# Patient Record
Sex: FEMALE | Race: WHITE
[De-identification: names, ages, dates, MRNs, and addresses within clinical notes are randomized per-mention and may not be internally consistent; named-entity substitution may affect disease eponyms.]

---

## 2019-02-25 ENCOUNTER — HOSPITAL ENCOUNTER (EMERGENCY)
Dept: HOSPITAL 97 - ER | Age: 64
Discharge: HOME | End: 2019-02-25
Payer: COMMERCIAL

## 2019-02-25 VITALS — DIASTOLIC BLOOD PRESSURE: 77 MMHG | SYSTOLIC BLOOD PRESSURE: 147 MMHG

## 2019-02-25 VITALS — OXYGEN SATURATION: 99 %

## 2019-02-25 VITALS — TEMPERATURE: 98.4 F

## 2019-02-25 DIAGNOSIS — J45.909: ICD-10-CM

## 2019-02-25 DIAGNOSIS — B02.39: Primary | ICD-10-CM

## 2019-02-25 DIAGNOSIS — Z88.2: ICD-10-CM

## 2019-02-25 PROCEDURE — 99283 EMERGENCY DEPT VISIT LOW MDM: CPT

## 2019-02-25 PROCEDURE — 96374 THER/PROPH/DIAG INJ IV PUSH: CPT

## 2019-02-25 PROCEDURE — 96375 TX/PRO/DX INJ NEW DRUG ADDON: CPT

## 2019-02-25 NOTE — ER
Nurse's Notes                                                                                     

 [facility]                                                                

Name: Hitesh Chris                                                                             

Age: 63 yrs                                                                                       

Sex: Female                                                                                       

: 1955                                                                                   

MRN: [id number]                                                                                   

Arrival Date: 2019                                                                          

Time: 10:26                                                                                       

Account#: [id number]                                                                            

Bed 25                                                                                            

Private MD: None, None                                                                            

Diagnosis: Other herpes zoster eye disease;Zoster [herpes zoster]                                 

                                                                                                  

Presentation:                                                                                     

                                                                                             

10:44 Presenting complaint: Patient states: PRIMARY DR SENT ME HERE FOR SHINGLES IN MY LEFT   ls4 

      EYE. I HAVE HAD A HEADACHE ALL WEEKEND. ITS THROBBING. Transition of care: patient was      

      not received from another setting of care. Onset of symptoms was 2019.         

      Risk Assessment: Do you want to hurt yourself or someone else? Patient reports no           

      desire to harm self or others. Initial Sepsis Screen: Does the patient meet any 2           

      criteria? No. Patient's initial sepsis screen is negative. Does the patient have a          

      suspected source of infection? No. Patient's initial sepsis screen is negative. Care        

      prior to arrival: None.                                                                     

10:44 Method Of Arrival: Ambulatory                                                           ls4 

10:44 Acuity: JOSÉ MIGUEL 3                                                                           ls4 

                                                                                                  

Triage Assessment:                                                                                

10:46 General: Appears uncomfortable, Behavior is calm, cooperative. Pain: Complains of pain  ls4 

      in LEFT EYE AND HEAD. EENT: Eyes REDNESS TO LEFT EYE. Neuro: No deficits noted.             

      Cardiovascular: No deficits noted. Respiratory: No deficits noted.                          

                                                                                                  

Historical:                                                                                       

- Allergies:                                                                                      

10:46 Sulfa (Sulfonamide Antibiotics);                                                        ls4 

- Home Meds:                                                                                      

10:46 Albuterol Inhl [Active]; Advair Diskus inhalation Inhl [Active];                        ls4 

- PMHx:                                                                                           

10:46 Asthma;                                                                                 ls4 

                                                                                                  

- Immunization history:: Adult Immunizations up to date.                                          

- Social history:: Smoking status: unknown Patient/guardian denies using alcohol,                 

  street drugs, The patient lives with family.                                                    

- Ebola Screening: : Patient negative for fever greater than or equal to 101.5 degrees            

  Fahrenheit, and additional compatible Ebola Virus Disease symptoms Patient denies               

  exposure to infectious person Patient denies travel to an Ebola-affected area in the            

  21 days before illness onset No symptoms or risks identified at this time.                      

- Family history:: not pertinent.                                                                 

                                                                                                  

                                                                                                  

Screening:                                                                                        

10:48 Abuse screen: Denies threats or abuse. Denies injuries from another. Nutritional        ls4 

      screening: No deficits noted. Tuberculosis screening: No symptoms or risk factors           

      identified. Fall Risk None identified.                                                      

                                                                                                  

Assessment:                                                                                       

10:48 General: SEE TRIAGE ASSESSMENT .                                                        ls4 

11:14 Reassessment: Patient appears in no apparent distress at this time.                     ls4 

11:57 Reassessment: Patient and/or family updated on plan of care and expected duration. Pain ls4 

      level reassessed. Patient is alert, oriented x 3, equal unlabored respirations, skin        

      warm/dry/pink.                                                                              

13:18 Reassessment: Dr. Hernandez's office notified that pt will not be able to make it to his    iw  

      office this afternoon, appt rescheduled for tomorrow at 1030 am.                            

13:37 Reassessment: Patient appears in no apparent distress at this time. Patient and/or      ls4 

      family updated on plan of care and expected duration. Pain level reassessed. Patient is     

      alert, oriented x 3, equal unlabored respirations, skin warm/dry/pink. PT FLUIDS AND        

      ANTIBIOTICS INFUSING. PT DOES NOT HAVE A RIDE SO WAITING FOR HER TO BE SAFE TO GET HOME     

      BEFORE DISCHARGING.                                                                         

                                                                                                  

Vital Signs:                                                                                      

10:46  / 77; Pulse 56; Resp 16; Temp 98.1; Pulse Ox 99% on R/A; Weight 97.52 kg; Pain   ls4 

      8/10;                                                                                       

11:45  / 74; Pulse 60; Resp 16; Temp 98.4(O); Pulse Ox 99% on R/A; Pain 3/10;           ls4 

12:46  / 77; Pulse 59; Resp 16; Pulse Ox 99% on R/A; Pain 2/10;                         ls4 

                                                                                                  

ED Course:                                                                                        

10:26 Patient arrived in ED.                                                                  mr  

10:26 None, None is Private Physician.                                                        mr  

10:38 Tatum Berry MD is Attending Physician.                                           ma2 

10:40 Louise Hunter, RN is Primary Nurse.                                                     ls4 

10:45 Triage completed.                                                                       ls4 

10:46 Arm band placed on.                                                                     ls4 

11:17 No provider procedures requiring assistance completed.                                  ls4 

11:56 Inserted saline lock: 24 gauge in left hand, using aseptic technique.                   ls4 

11:57 Patient has correct armband on for positive identification. Bed in low position. Call   ls4 

      light in reach. Side rails up X 1. Pulse ox on. NIBP on. Warm blanket given. Pillow         

      given.                                                                                      

12:23 Tha Hernandez MD is Referral Physician.                                              ma2 

13:59 IV discontinued, intact, bleeding controlled, No redness/swelling at site.              ls4 

                                                                                                  

Administered Medications:                                                                         

11:57 Drug: NS 0.9% 1000 ml Route: IV; Rate: 1 bolus; Site: left hand;                        ls4 

11:57 Drug: morphine 4 mg Route: IVP; Site: left hand;                                        ls4 

12:13 Follow up: Response: No adverse reaction; Marked relief of symptoms                     ls4 

11:57 Drug: Zofran 4 mg Route: IVP; Site: left hand;                                          ls4 

12:12 Follow up: Response: No adverse reaction; Marked relief of symptoms                     ls4 

12:19 Drug: Acyclovir (20mg/kg) 2 grams Route: IVPB; Site: left hand;                         ls4 

12:42 Drug: Bacitracin Ointment (500 unit/g) 1 application Route: Topical; Site: affected     ls4 

      area;                                                                                       

                                                                                                  

                                                                                                  

Outcome:                                                                                          

12:24 Discharge ordered by MD.                                                                ma2 

14:18 Patient left the ED.                                                                    ls4 

                                                                                                  

Signatures:                                                                                       

Hiral Nava Irene, RN                     ELIZABETH   iw                                                   

Tatum Berry MD MD   ma2                                                  

Louise Hunter RN                       RN   ls4                                                  

                                                                                                  

Corrections: (The following items were deleted from the chart)                                    

11:51 10:46  / 77; Pulse 56bpm; Resp 16bpm; Pulse Ox 99% RA; Temp 98.1F; Pain 8/10; ls4 ls4 

                                                                                                  

**************************************************************************************************

## 2019-02-25 NOTE — EDPHYS
Physician Documentation                                                                           

 [facility]                                                                

Name: Hitesh Chris                                                                             

Age: 63 yrs                                                                                       

Sex: Female                                                                                       

: 1955                                                                                   

MRN: [id number]                                                                                   

Arrival Date: 2019                                                                          

Time: 10:26                                                                                       

Account#: [id number]                                                                            

Bed 25                                                                                            

Private MD: None, None                                                                            

ED Physician Tatum Berry                                                                    

HPI:                                                                                              

                                                                                             

12:17 This 63 yrs old  Female presents to ER via Ambulatory with complaints of       ma2 

      Shingles.                                                                                   

12:17 The patient's rash thought to be caused by shingle. The rash is located on the face.    ma2 

      The rash can be described as raised. Associated signs and symptoms: Pertinent               

      negatives: burning sensation, difficulty breathing, itching, nausea, swelling of lips.      

      Severity of symptoms: At their worst the symptoms were moderate in the emergency            

      department the symptoms are unchanged. The patient has experienced a previous episode.      

                                                                                                  

Historical:                                                                                       

- Allergies:                                                                                      

10:46 Sulfa (Sulfonamide Antibiotics);                                                        ls4 

- Home Meds:                                                                                      

10:46 Albuterol Inhl [Active]; Advair Diskus inhalation Inhl [Active];                        ls4 

- PMHx:                                                                                           

10:46 Asthma;                                                                                 ls4 

                                                                                                  

- Immunization history:: Adult Immunizations up to date.                                          

- Social history:: Smoking status: unknown Patient/guardian denies using alcohol,                 

  street drugs, The patient lives with family.                                                    

- Ebola Screening: : Patient negative for fever greater than or equal to 101.5 degrees            

  Fahrenheit, and additional compatible Ebola Virus Disease symptoms Patient denies               

  exposure to infectious person Patient denies travel to an Ebola-affected area in the            

  21 days before illness onset No symptoms or risks identified at this time.                      

- Family history:: not pertinent.                                                                 

                                                                                                  

                                                                                                  

ROS:                                                                                              

12:17 Constitutional: Negative for fever, chills, and weight loss.                            ma2 

12:17 Skin: Positive for lesions, Negative for cellulitis, discoloration, swelling,               

      ulceration.                                                                                 

12:17 All other systems are negative.                                                             

                                                                                                  

Exam:                                                                                             

12:17 Constitutional:  This is a well developed, well nourished patient who is awake, alert,  ma2 

      and in no acute distress. ENT:  Nares patent. No nasal discharge, no septal                 

      abnormalities noted.  Tympanic membranes are normal and external auditory canals are        

      clear.  Oropharynx with no redness, swelling, or masses, exudates, or evidence of           

      obstruction, uvula midline.  Mucous membranes moist. Chest/axilla:  Normal chest wall       

      appearance and motion.  Nontender with no deformity.  No lesions are appreciated.           

      Cardiovascular:  Regular rate and rhythm with a normal S1 and S2.  No gallops, murmurs,     

      or rubs.  Normal PMI, no JVD.  No pulse deficits. Respiratory:  Lungs have equal breath     

      sounds bilaterally, clear to auscultation and percussion.  No rales, rhonchi or wheezes     

      noted.  No increased work of breathing, no retractions or nasal flaring. Abdomen/GI:        

      Soft, non-tender, with normal bowel sounds.  No distension or tympany.  No guarding or      

      rebound.  No evidence of tenderness throughout. MS/ Extremity:  Pulses equal, no            

      cyanosis.  Neurovascular intact.  Full, normal range of motion. Neuro:  Awake and           

      alert, GCS 15, oriented to person, place, time, and situation.  Cranial nerves II-XII       

      grossly intact.  Motor strength 5/5 in all extremities.  Sensory grossly intact.            

      Cerebellar exam normal.  Normal gait.                                                       

12:17 Head/face: Noted is vesicles on left forehead, and upper anterior left scalp, including     

      the eye nose and nasal cavities and ears canals all wnl .                                   

12:17 Eyes: Pupils: equal, round, and reactive to light and accomodation, Conjunctiva:            

      exudate, in the left eye, injected, Corneas:                                                

                                                                                                  

Vital Signs:                                                                                      

10:46  / 77; Pulse 56; Resp 16; Temp 98.1; Pulse Ox 99% on R/A; Weight 97.52 kg; Pain   ls4 

      8/10;                                                                                       

11:45  / 74; Pulse 60; Resp 16; Temp 98.4(O); Pulse Ox 99% on R/A; Pain 3/10;           ls4 

12:46  / 77; Pulse 59; Resp 16; Pulse Ox 99% on R/A; Pain 2/10;                         ls4 

                                                                                                  

MDM:                                                                                              

10:38 Patient medically screened.                                                             ma2 

12:17 Differential diagnosis: H zoster opthlamicus, and shingles of V1 trigeminal n. unlikely ma2 

      [place]. Data reviewed: vital signs, nurses notes. Counseling: I had a detailed        

      discussion with the patient and/or guardian regarding: the historical points, exam          

      findings, and any diagnostic results supporting the discharge/admit diagnosis, the          

      presence of at least one elevated blood pressure reading (>120/80) during this              

      emergency department visit, the need for outpatient follow up. Response to treatment:       

      the patient's symptoms have markedly improved after treatment. ED course: I talked with     

      dr. Fish who recommend to send patient to his clinic today .                               

13:14 ED course: unable to make it to eye clinic today, called dr. fish again who recommends ma2 

      zovirax eye ointment and to see him in clinic tomorrow morning .                            

                                                                                                  

Administered Medications:                                                                         

11:57 Drug: NS 0.9% 1000 ml Route: IV; Rate: 1 bolus; Site: left hand;                        ls4 

11:57 Drug: morphine 4 mg Route: IVP; Site: left hand;                                        ls4 

12:13 Follow up: Response: No adverse reaction; Marked relief of symptoms                     ls4 

11:57 Drug: Zofran 4 mg Route: IVP; Site: left hand;                                          ls4 

12:12 Follow up: Response: No adverse reaction; Marked relief of symptoms                     ls4 

12:19 Drug: Acyclovir (20mg/kg) 2 grams Route: IVPB; Site: left hand;                         ls4 

12:42 Drug: Bacitracin Ointment (500 unit/g) 1 application Route: Topical; Site: affected     ls4 

      area;                                                                                       

                                                                                                  

                                                                                                  

Disposition:                                                                                      

19 12:24 Discharged to Home. Impression: Other herpes zoster eye disease, Zoster [herpes    

  zoster].                                                                                        

- Condition is Stable.                                                                            

- Discharge Instructions: Shingles.                                                               

- Prescriptions for Tylenol- Codeine #3 300-30 mg Oral Tablet - take 2 tablet by ORAL             

  route every 6 hours As needed; 30 tablet. Acyclovir 400 mg Oral Tablet - take 1                 

  tablet by ORAL route every 8 hours; 30 tablet. Bactroban 2 % Topical Ointment - Apply           

  to affected area 1 application by TOPICAL route every 12 hours; 15 gram. Neurontin              

  300 mg Oral Capsule - take 1 capsule by ORAL route every 8 hours; 30 capsule. Zovirax           

  5 % Topical Cream - apply 1 application by OPHTHALMIC route 5 times per day; 3 gram.            

  Zofran 4 mg Oral Tablet - take 1 tablet by ORAL route every 12 hours As needed; 20              

  tablet.                                                                                         

- Medication Reconciliation Form, Thank You Letter, Antibiotic Education, Prescription            

  Opioid Use form.                                                                                

- Follow up: Tha Fish MD; When: Tomorrow; Reason: Continuance of care.                    

- Notes: See Dr. Fish office today                                                               

                                                                                                  

                                                                                                  

Signatures:                                                                                       

Tatum Berry MD MD   ma2                                                  

Louise Hunter, RN                       RN   ls4                                                  

                                                                                                  

Corrections: (The following items were deleted from the chart)                                    

12:50 12:24 2019 12:24 Discharged to Home. Impression: Other herpes zoster eye disease; ls4 

      Zoster [herpes zoster]. Condition is Stable. Forms are Medication Reconciliation Form,      

      Thank You Letter, Antibiotic Education, Prescription Opioid Use. Follow up: Tha Fish; When: Tomorrow; Reason: Continuance of care. alverto                                     

14:18 12:50 2019 12:24 Discharged to Home. Impression: Other herpes zoster eye disease; ls4 

      Zoster [herpes zoster]. Condition is Stable. Discharge Instructions: Shingles.              

      Prescriptions for Tylenol-Codeine #3 300-30 mg Oral Tablet - take 2 tablet by ORAL          

      route every 6 hours As needed; 30 tablet, Acyclovir 400 mg Oral Tablet - take 1 tablet      

      by ORAL route every 8 hours; 30 tablet, Bactroban 2 % Topical Ointment - Apply to           

      affected area 1 application by TOPICAL route every 12 hours; 15 gram. and Forms are         

      Medication Reconciliation Form, Thank You Letter, Antibiotic Education, Prescription        

      Opioid Use. Follow up: Tha Fish; When: Tomorrow; Reason: Continuance of care. ls4     

                                                                                                  

**************************************************************************************************